# Patient Record
(demographics unavailable — no encounter records)

---

## 2024-12-30 NOTE — DISCUSSION/SUMMARY
[de-identified] : Discussion had with the patient.  He was offered aspiration and cortisone injection today to the left knee.  Decision was made to hold the procedure as he is planning to play some football within the next day or 2.  Recommend he stay on the meloxicam to try and alleviate joint pain/inflammation.  Advised on use of medication with GI precautions.  He can also try some outpatient physical therapy to treat the knee.  Prescription provided.  He understands that further treatment options could include joint aspiration/cortisone, HA or PRP injections.  Definitive management should arthritic condition worsen would potentially involve knee replacement surgery.

## 2024-12-30 NOTE — PHYSICAL EXAM
[LE] : Sensory: Intact in bilateral lower extremities [DP] : dorsalis pedis 2+ and symmetric bilaterally [de-identified] : Walking slowly without assistive aids.  Equal leg lengths.  Neutral alignment of both knees.  Small to moderate effusion left knee joint.  Left knee active motion near full extension to 120 degrees flexion with pain and crepitus.  Knee is grossly stable with varus/valgus and drawer testing.  No calf tenderness.  Right knee: No warmth or swelling today.  Right knee active motion 5 to 120 degrees.  Crepitus with knee flexion.  Negative Eduin testing.  Right knee is stable with varus/valgus and drawer testing.  No calf tenderness.

## 2024-12-30 NOTE — HISTORY OF PRESENT ILLNESS
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 He has tried Mobic and home exercises but continues to have pain. He has pain when flexing his knees past 90 degrees. He experiences pain and swelling after activity.  His left knee is more symptomatic than his right. Denies medical issues.   [3] : a maximum pain level of 3/10